# Patient Record
Sex: MALE | Race: WHITE | NOT HISPANIC OR LATINO | URBAN - METROPOLITAN AREA
[De-identification: names, ages, dates, MRNs, and addresses within clinical notes are randomized per-mention and may not be internally consistent; named-entity substitution may affect disease eponyms.]

---

## 2020-07-25 ENCOUNTER — TELEPHONE ENCOUNTER (OUTPATIENT)
Dept: URBAN - METROPOLITAN AREA CLINIC 13 | Facility: CLINIC | Age: 45
End: 2020-07-25

## 2020-07-25 RX ORDER — ESOMEPRAZOLE MAGNESIUM 40 MG
TAKE 1 CAPSULE DAILY UNKNOWN DOSE PER PATIENT CAPSULE,DELAYED RELEASE (ENTERIC COATED) ORAL
Refills: 0 | OUTPATIENT
End: 2015-09-01

## 2020-07-25 RX ORDER — BUSPIRONE HYDROCHLORIDE 10 MG/1
TAKE 1/2 TABLET TWICE DAILY FOR THE 1ST MONTH, THEN 1 TABLET TWICE DAILY THEREAFTER TABLET ORAL
Qty: 30 | Refills: 3 | OUTPATIENT
Start: 2015-11-02 | End: 2016-03-08

## 2020-07-25 RX ORDER — BUSPIRONE HYDROCHLORIDE 5 MG/1
TAKE 1 TABLET TWICE DAILY TABLET ORAL
Qty: 60 | Refills: 1 | OUTPATIENT
Start: 2015-02-26 | End: 2015-04-07

## 2020-07-25 RX ORDER — AMITRIPTYLINE HYDROCHLORIDE 25 MG/1
TAKE 1 TABLET AT BEDTIME TABLET, FILM COATED ORAL
Qty: 30 | Refills: 6 | OUTPATIENT
Start: 2016-01-07 | End: 2016-03-08

## 2020-07-25 RX ORDER — NYSTATIN 100000 [USP'U]/G
APPLY SPARINGLY TO AFFECTED AREA(S) TWICE DAILY POWDER TOPICAL
Qty: 60 | Refills: 1 | OUTPATIENT
Start: 2016-06-28 | End: 2016-08-10

## 2020-07-25 RX ORDER — OMEPRAZOLE 40 MG/1
TAKE 1 CAPSULE TWICE DAILY BEFORE BREAKFAST AND BEFORE DINNER CAPSULE, DELAYED RELEASE ORAL
Qty: 180 | Refills: 3 | OUTPATIENT
Start: 2015-02-26 | End: 2015-09-01

## 2020-07-26 ENCOUNTER — TELEPHONE ENCOUNTER (OUTPATIENT)
Dept: URBAN - METROPOLITAN AREA CLINIC 13 | Facility: CLINIC | Age: 45
End: 2020-07-26

## 2020-07-26 RX ORDER — FEXOFENADINE HCL 180 MG/1
TABLET ORAL
Qty: 90 | Refills: 0 | Status: ACTIVE | COMMUNITY
Start: 2018-02-19

## 2020-07-26 RX ORDER — FEXOFENADINE HCL 180 MG/1
TAKE 1 TABLET DAILY TABLET ORAL
Refills: 0 | Status: ACTIVE | COMMUNITY
Start: 2018-02-02

## 2020-07-26 RX ORDER — FLUTICASONE PROPIONATE 50 UG/1
SPRAY, METERED NASAL
Qty: 16 | Refills: 0 | Status: ACTIVE | COMMUNITY
Start: 2018-01-18

## 2020-07-26 RX ORDER — IPRATROPIUM BROMIDE 21 UG/1
SPRAY, METERED NASAL
Qty: 30 | Refills: 0 | Status: ACTIVE | COMMUNITY
Start: 2018-11-05

## 2020-07-26 RX ORDER — TRIAMCINOLONE ACETONIDE 1 MG/G
CREAM TOPICAL
Qty: 454 | Refills: 0 | Status: ACTIVE | COMMUNITY
Start: 2018-11-26

## 2020-10-20 ENCOUNTER — LAB OUTSIDE AN ENCOUNTER (OUTPATIENT)
Dept: URBAN - METROPOLITAN AREA CLINIC 113 | Facility: CLINIC | Age: 45
End: 2020-10-20

## 2020-10-20 ENCOUNTER — OFFICE VISIT (OUTPATIENT)
Dept: URBAN - METROPOLITAN AREA CLINIC 113 | Facility: CLINIC | Age: 45
End: 2020-10-20
Payer: OTHER GOVERNMENT

## 2020-10-20 VITALS
WEIGHT: 190 LBS | DIASTOLIC BLOOD PRESSURE: 74 MMHG | HEIGHT: 77 IN | HEART RATE: 72 BPM | TEMPERATURE: 98.6 F | BODY MASS INDEX: 22.43 KG/M2 | SYSTOLIC BLOOD PRESSURE: 118 MMHG | RESPIRATION RATE: 20 BRPM

## 2020-10-20 DIAGNOSIS — K58.9 IRRITABLE BOWEL SYNDROME: ICD-10-CM

## 2020-10-20 DIAGNOSIS — E83.119 HEMOCHROMATOSIS: ICD-10-CM

## 2020-10-20 PROCEDURE — G8482 FLU IMMUNIZE ORDER/ADMIN: HCPCS | Performed by: INTERNAL MEDICINE

## 2020-10-20 PROCEDURE — G8427 DOCREV CUR MEDS BY ELIG CLIN: HCPCS | Performed by: INTERNAL MEDICINE

## 2020-10-20 PROCEDURE — 1036F TOBACCO NON-USER: CPT | Performed by: INTERNAL MEDICINE

## 2020-10-20 PROCEDURE — 99214 OFFICE O/P EST MOD 30 MIN: CPT | Performed by: INTERNAL MEDICINE

## 2020-10-20 PROCEDURE — G8420 CALC BMI NORM PARAMETERS: HCPCS | Performed by: INTERNAL MEDICINE

## 2020-10-20 RX ORDER — TRIAMCINOLONE ACETONIDE 1 MG/G
CREAM TOPICAL
Qty: 454 | Refills: 0 | Status: DISCONTINUED | COMMUNITY
Start: 2018-11-26

## 2020-10-20 RX ORDER — FEXOFENADINE HCL 180 MG/1
TAKE 1 TABLET DAILY TABLET ORAL
Refills: 0 | Status: DISCONTINUED | COMMUNITY
Start: 2018-02-02

## 2020-10-20 RX ORDER — FLUTICASONE PROPIONATE 50 UG/1
SPRAY, METERED NASAL
Qty: 16 | Refills: 0 | Status: DISCONTINUED | COMMUNITY
Start: 2018-01-18

## 2020-10-20 RX ORDER — IPRATROPIUM BROMIDE 21 UG/1
SPRAY, METERED NASAL
Qty: 30 | Refills: 0 | Status: DISCONTINUED | COMMUNITY
Start: 2018-11-05

## 2020-10-20 NOTE — HPI-TODAY'S VISIT:
Patient returns today in follow-up.  Has been most recently followed by Dr. Gamboa for irritable bowel syndrome.  He describes some fluctuating bowel movements with occasional abdominal pain.  No real precipitant although seems to occur more often when he is traveling.  He is not on a daily supplement on a regular basis as the recommended dose of fiber was too much for him.  He denies any blood in his stool.  No fevers or chills. He is really here today with a new complaint of request.  He was diagnosed previously by Dr. Ender Duncan with hemochromatosis based on an elevated iron saturation and ferritin.  He has no history of liver disease.  No jaundice or dark urine.  No blood in the stool.  No known family history of liver disease.  His records are reviewed showing most recent labs with Dr. Laurent showing a normal LFT panel.  His hemoglobin is 14.7 platelet of 184 and a ferritin of 231.  He tells me he has 1 more phlebotomy planned prior to following up with Dr. Duncan.  He does not believe he has had any abdominal imaging.

## 2020-12-14 ENCOUNTER — OFFICE VISIT (OUTPATIENT)
Dept: URBAN - METROPOLITAN AREA CLINIC 113 | Facility: CLINIC | Age: 45
End: 2020-12-14
Payer: OTHER GOVERNMENT

## 2020-12-14 VITALS
HEIGHT: 77 IN | HEART RATE: 90 BPM | TEMPERATURE: 98.2 F | BODY MASS INDEX: 22.67 KG/M2 | WEIGHT: 192 LBS | SYSTOLIC BLOOD PRESSURE: 99 MMHG | RESPIRATION RATE: 20 BRPM | DIASTOLIC BLOOD PRESSURE: 65 MMHG

## 2020-12-14 DIAGNOSIS — K58.9 IRRITABLE BOWEL SYNDROME: ICD-10-CM

## 2020-12-14 DIAGNOSIS — R79.89 ELEVATED LFTS: ICD-10-CM

## 2020-12-14 DIAGNOSIS — E83.119 HEMOCHROMATOSIS: ICD-10-CM

## 2020-12-14 PROCEDURE — G8482 FLU IMMUNIZE ORDER/ADMIN: HCPCS | Performed by: INTERNAL MEDICINE

## 2020-12-14 PROCEDURE — G8420 CALC BMI NORM PARAMETERS: HCPCS | Performed by: INTERNAL MEDICINE

## 2020-12-14 PROCEDURE — 1036F TOBACCO NON-USER: CPT | Performed by: INTERNAL MEDICINE

## 2020-12-14 PROCEDURE — 99213 OFFICE O/P EST LOW 20 MIN: CPT | Performed by: INTERNAL MEDICINE

## 2020-12-14 PROCEDURE — G8427 DOCREV CUR MEDS BY ELIG CLIN: HCPCS | Performed by: INTERNAL MEDICINE

## 2020-12-14 NOTE — HPI-TODAY'S VISIT:
Patient returns today in follow-up.  He has history of hemochromatosis.  He is getting getting phlebotomy under the direction of Dr. Duncan.  He denies any new complaints.  No abdominal pain, nausea or vomiting.  No blood in the stool.  No jaundice or dark urine.  He does have fluctuating bowel patterns consistent with his known irritable bowel syndrome.  He reports he can get diarrhea and then will take a half of Imodium.  Sometimes this can back him up.  No real abdominal cramping with it.  He has been taking fiber on most days.  Ultrasound was reviewed with him which showed no evidence of HCC or advanced liver problems.

## 2020-12-22 ENCOUNTER — WEB ENCOUNTER (OUTPATIENT)
Dept: URBAN - METROPOLITAN AREA CLINIC 113 | Facility: CLINIC | Age: 45
End: 2020-12-22

## 2021-11-24 ENCOUNTER — TELEPHONE ENCOUNTER (OUTPATIENT)
Dept: URBAN - METROPOLITAN AREA CLINIC 113 | Facility: CLINIC | Age: 46
End: 2021-11-24

## 2022-11-18 ENCOUNTER — OFFICE VISIT (OUTPATIENT)
Dept: URBAN - METROPOLITAN AREA CLINIC 113 | Facility: CLINIC | Age: 47
End: 2022-11-18
Payer: OTHER GOVERNMENT

## 2022-11-18 VITALS
DIASTOLIC BLOOD PRESSURE: 79 MMHG | SYSTOLIC BLOOD PRESSURE: 119 MMHG | HEART RATE: 79 BPM | WEIGHT: 192 LBS | RESPIRATION RATE: 16 BRPM | HEIGHT: 77 IN | TEMPERATURE: 97.5 F | BODY MASS INDEX: 22.67 KG/M2

## 2022-11-18 DIAGNOSIS — E83.119 HEMOCHROMATOSIS: ICD-10-CM

## 2022-11-18 DIAGNOSIS — K86.89 PANCREATIC INSUFFICIENCY: ICD-10-CM

## 2022-11-18 DIAGNOSIS — R10.9 RIGHT SIDED ABDOMINAL PAIN: ICD-10-CM

## 2022-11-18 DIAGNOSIS — R63.4 WEIGHT LOSS: ICD-10-CM

## 2022-11-18 DIAGNOSIS — R79.89 ELEVATED LFTS: ICD-10-CM

## 2022-11-18 PROBLEM — 399187006 HEMOCHROMATOSIS: Status: ACTIVE | Noted: 2020-10-20

## 2022-11-18 PROCEDURE — 99214 OFFICE O/P EST MOD 30 MIN: CPT | Performed by: NURSE PRACTITIONER

## 2022-11-18 RX ORDER — DICYCLOMINE HYDROCHLORIDE 10 MG/1
1 CAPSULE CAPSULE ORAL
Qty: 60 | Refills: 1 | OUTPATIENT
Start: 2022-11-18 | End: 2023-01-17

## 2022-11-18 RX ORDER — PANCRELIPASE 36000; 180000; 114000 [USP'U]/1; [USP'U]/1; [USP'U]/1
2 TABS WITH MEALS, 1 WITH SNACKS CAPSULE, DELAYED RELEASE PELLETS ORAL
Qty: 720 | Refills: 3 | OUTPATIENT
Start: 2022-11-18 | End: 2023-11-13

## 2022-11-18 NOTE — HPI-TODAY'S VISIT:
48 yo with a history of hemochromatosis, elevated LFTs and IBS, presenting for evaluation of pancreas insufficiency. He was seen in the [facility] in December 2020. At that time, he was getting phlebotomy at the direction of Dr. Duncan. His ferritin was low, so he was expected to be transitioned to maintenance phlebotomy. Abdominal US was unremarkable for evidence of HCC or advanced liver disease. He was using Imodium as needed for diarrhea.   Labs 7/13/22: WBC 5.45, Hg 15.3, MCV 92/5. Plt 171, Ferritin 43.4, Iron 136, Tbili 1, AST 23, ALT 21, ALP 67, Na 3.9, BUN 17  He has not required phlebotomy since 2020. He tells me that now there is question about the hemochromatosis testing.   He continues with diarrhea predominant bowel habit, with at least 2 to 3 stools per day. He has had stomach issues for years. In the last 6 months, he has been experiencing increased fatigue and unexplained weight loss of 10-15 pounds. He is not exercising. He has episodes of increased diarrhea predominant bowel habits with 3 to 6 urgent loose stools per day associated with abdominal pain and a migraine. These episodes are not related to any change in diet. He can slow down the diarrhea with use of 1/2 an Imodium tablet. He tries not to take Imodium because he then can have constipation. There is occasionally blood per rectum, attributed to hemorrhoidal etiology as it is small in volume and infrequent. There is no black stool. He can have nausea associated with migraines and abdominal pain, but no vomiting. He admits feeling hungry constantly despite eating 3 normal sized meals and snacking frequently. He denies any early satiety.  He tells me that he had recent labs an stool studies with Dr. Laurent notable for pancreas insufficiency. We do not have these labs for review currently.  He has a new complaint of right sided abdominal pain, occurring constantly, that started about 2 weeks ago. The pain is increasing in severity. No alleviating or exacerbating factors identified. Pain is likened to a dull, pinching pain that is becoming sharper overtime. He has not had any abdominal imaging.

## 2022-12-02 ENCOUNTER — TELEPHONE ENCOUNTER (OUTPATIENT)
Dept: URBAN - METROPOLITAN AREA CLINIC 113 | Facility: CLINIC | Age: 47
End: 2022-12-02

## 2022-12-29 ENCOUNTER — OFFICE VISIT (OUTPATIENT)
Dept: URBAN - METROPOLITAN AREA CLINIC 113 | Facility: CLINIC | Age: 47
End: 2022-12-29
Payer: OTHER GOVERNMENT

## 2022-12-29 VITALS
HEIGHT: 77 IN | HEART RATE: 78 BPM | WEIGHT: 196 LBS | DIASTOLIC BLOOD PRESSURE: 77 MMHG | RESPIRATION RATE: 18 BRPM | BODY MASS INDEX: 23.14 KG/M2 | SYSTOLIC BLOOD PRESSURE: 127 MMHG | TEMPERATURE: 97.6 F

## 2022-12-29 DIAGNOSIS — R10.9 RIGHT SIDED ABDOMINAL PAIN: ICD-10-CM

## 2022-12-29 DIAGNOSIS — E83.110 HEREDITARY HEMOCHROMATOSIS: ICD-10-CM

## 2022-12-29 DIAGNOSIS — K58.9 IRRITABLE BOWEL SYNDROME: ICD-10-CM

## 2022-12-29 DIAGNOSIS — K86.89 PANCREATIC INSUFFICIENCY: ICD-10-CM

## 2022-12-29 PROBLEM — 10743008 IRRITABLE BOWEL SYNDROME: Status: ACTIVE | Noted: 2020-10-20

## 2022-12-29 PROBLEM — 35400008: Status: ACTIVE | Noted: 2022-12-29

## 2022-12-29 PROCEDURE — 99213 OFFICE O/P EST LOW 20 MIN: CPT | Performed by: INTERNAL MEDICINE

## 2022-12-29 RX ORDER — DICYCLOMINE HYDROCHLORIDE 10 MG/1
1 CAPSULE CAPSULE ORAL
Qty: 60 | Refills: 1 | Status: ACTIVE | COMMUNITY
Start: 2022-11-18 | End: 2023-01-17

## 2022-12-29 RX ORDER — PANCRELIPASE 36000; 180000; 114000 [USP'U]/1; [USP'U]/1; [USP'U]/1
2 TABS WITH MEALS, 1 WITH SNACKS CAPSULE, DELAYED RELEASE PELLETS ORAL
OUTPATIENT
Start: 2022-11-18

## 2022-12-29 RX ORDER — DICYCLOMINE HYDROCHLORIDE 10 MG/1
1 CAPSULE CAPSULE ORAL
OUTPATIENT
Start: 2022-11-18

## 2022-12-29 RX ORDER — PANCRELIPASE 36000; 180000; 114000 [USP'U]/1; [USP'U]/1; [USP'U]/1
2 TABS WITH MEALS, 1 WITH SNACKS CAPSULE, DELAYED RELEASE PELLETS ORAL
Qty: 720 | Refills: 3 | Status: ACTIVE | COMMUNITY
Start: 2022-11-18 | End: 2023-11-13

## 2022-12-29 NOTE — HPI-TODAY'S VISIT:
Patient returns today in follow-up.  History of hemochromatosis followed by Dr. Duncan.  He has not required phlebotomy as he has had a persistently low ferritin.  He has a variety of complaints including abdominal discomfort and diarrhea which has been evaluated in the past felt to be irritable bowel syndrome.  He was placed on Bentyl and fiber.  More recently he was evaluated for some ongoing diarrhea after a fecal elastase was found to be low and treated presumptively for EPI.  He states that he thinks that the enzyme supplementation is helped some.  He thought it may have caused him to have more diarrhea.  However he also stopped his fiber when he started the enzyme supplementation.  There is no blood in the stool.  His abdominal pain is improved.  He is not having new weight loss.  He has chronic joint complaints which have been evaluated by [facility] where he describes himself as a frequent flyer.  He has a lot of fatigue.  He does describe fecal urgency.

## 2023-01-21 ENCOUNTER — WEB ENCOUNTER (OUTPATIENT)
Dept: URBAN - METROPOLITAN AREA SURGERY CENTER 25 | Facility: SURGERY CENTER | Age: 48
End: 2023-01-21

## 2023-01-31 ENCOUNTER — TELEPHONE ENCOUNTER (OUTPATIENT)
Dept: URBAN - METROPOLITAN AREA CLINIC 113 | Facility: CLINIC | Age: 48
End: 2023-01-31

## 2023-01-31 RX ORDER — PANCRELIPASE 36000; 180000; 114000 [USP'U]/1; [USP'U]/1; [USP'U]/1
2 TABS WITH MEALS, 1 WITH SNACKS CAPSULE, DELAYED RELEASE PELLETS ORAL
Qty: 630 CAPSULE | Refills: 3

## 2023-10-30 ENCOUNTER — TELEPHONE ENCOUNTER (OUTPATIENT)
Dept: URBAN - METROPOLITAN AREA CLINIC 113 | Facility: CLINIC | Age: 48
End: 2023-10-30

## 2023-10-30 RX ORDER — PANCRELIPASE 36000; 180000; 114000 [USP'U]/1; [USP'U]/1; [USP'U]/1
2 TABS WITH MEALS, 1 WITH SNACKS CAPSULE, DELAYED RELEASE PELLETS ORAL
Qty: 630 CAPSULE | Refills: 3
End: 2024-10-24

## 2023-11-03 ENCOUNTER — ERX REFILL RESPONSE (OUTPATIENT)
Dept: URBAN - METROPOLITAN AREA CLINIC 113 | Facility: CLINIC | Age: 48
End: 2023-11-03

## 2023-11-03 RX ORDER — PANCRELIPASE 36000; 180000; 114000 [USP'U]/1; [USP'U]/1; [USP'U]/1
2 TABS WITH MEALS, 1 WITH SNACKS CAPSULE, DELAYED RELEASE PELLETS ORAL
Qty: 630 CAPSULE | Refills: 3 | OUTPATIENT

## 2023-11-03 RX ORDER — PANCRELIPASE 36000; 180000; 114000 [USP'U]/1; [USP'U]/1; [USP'U]/1
TAKE 2 CAPSULES WITH MEALS (DURING EVERY MEAL) AND 1 CAPSULE WITH SNACKS (DURING EVERY SNACK) CAPSULE, DELAYED RELEASE PELLETS ORAL
Qty: 630 CAPSULE | Refills: 4 | OUTPATIENT

## 2023-12-27 ENCOUNTER — DASHBOARD ENCOUNTERS (OUTPATIENT)
Age: 48
End: 2023-12-27

## 2023-12-27 ENCOUNTER — CLAIMS CREATED FROM THE CLAIM WINDOW (OUTPATIENT)
Dept: URBAN - METROPOLITAN AREA CLINIC 113 | Facility: CLINIC | Age: 48
End: 2023-12-27
Payer: OTHER GOVERNMENT

## 2023-12-27 VITALS
SYSTOLIC BLOOD PRESSURE: 116 MMHG | TEMPERATURE: 97.3 F | BODY MASS INDEX: 22.43 KG/M2 | RESPIRATION RATE: 16 BRPM | HEART RATE: 63 BPM | HEIGHT: 77 IN | DIASTOLIC BLOOD PRESSURE: 73 MMHG | WEIGHT: 190 LBS

## 2023-12-27 DIAGNOSIS — E83.119 HEMOCHROMATOSIS: ICD-10-CM

## 2023-12-27 DIAGNOSIS — K86.89 PANCREATIC INSUFFICIENCY: ICD-10-CM

## 2023-12-27 DIAGNOSIS — K58.9 IRRITABLE BOWEL SYNDROME: ICD-10-CM

## 2023-12-27 DIAGNOSIS — K58.8 OTHER IRRITABLE BOWEL SYNDROME: ICD-10-CM

## 2023-12-27 PROCEDURE — 99214 OFFICE O/P EST MOD 30 MIN: CPT | Performed by: INTERNAL MEDICINE

## 2023-12-27 RX ORDER — PANCRELIPASE 36000; 180000; 114000 [USP'U]/1; [USP'U]/1; [USP'U]/1
TAKE 2 CAPSULES WITH MEALS (DURING EVERY MEAL) AND 1 CAPSULE WITH SNACKS (DURING EVERY SNACK) CAPSULE, DELAYED RELEASE PELLETS ORAL
Qty: 630 CAPSULE | Refills: 4 | Status: ACTIVE | COMMUNITY

## 2023-12-27 RX ORDER — PANCRELIPASE 36000; 180000; 114000 [USP'U]/1; [USP'U]/1; [USP'U]/1
TAKE 2 CAPSULES WITH MEALS (DURING EVERY MEAL) AND 1 CAPSULE WITH SNACKS (DURING EVERY SNACK) CAPSULE, DELAYED RELEASE PELLETS ORAL
Qty: 630 CAPSULE | Refills: 3

## 2023-12-27 RX ORDER — DICYCLOMINE HYDROCHLORIDE 10 MG/1
1 CAPSULE CAPSULE ORAL
Status: ON HOLD | COMMUNITY
Start: 2022-11-18

## 2023-12-27 NOTE — HPI-TODAY'S VISIT:
Patient returns today in follow-up.  He reports that he is doing relatively well.  Sometimes needs extra pancreatic enzymes which does help with loose stool and fecal urgency.  Not really having much in the way abdominal cramping.  Not requiring dicyclomine.  Denies any jaundice or dark urine.  He brings with him labs which I reviewed from November.  He had a normal CBC.  Creatinine was normal.  LFTs were normal.  Iron saturation was 64% but his ferritin was only 93.  TSH was normal.

## 2024-11-10 ENCOUNTER — ERX REFILL RESPONSE (OUTPATIENT)
Dept: URBAN - METROPOLITAN AREA CLINIC 113 | Facility: CLINIC | Age: 49
End: 2024-11-10

## 2024-11-10 RX ORDER — PANCRELIPASE 36000; 180000; 114000 [USP'U]/1; [USP'U]/1; [USP'U]/1
TAKE 2 CAPSULES WITH MEALS (DURING EVERY MEAL) AND 1 CAPSULE WITH SNACKS (DURING EVERY SNACK) CAPSULE, DELAYED RELEASE PELLETS ORAL
Qty: 630 CAPSULE | Refills: 3 | OUTPATIENT

## 2024-11-10 RX ORDER — PANCRELIPASE 36000; 180000; 114000 [USP'U]/1; [USP'U]/1; [USP'U]/1
TAKE 2 CAPSULES WITH MEALS (DURING EVERY MEAL) AND 1 CAPSULE WITH SNACKS (DURING EVERY SNACK) CAPSULE, DELAYED RELEASE PELLETS ORAL
Qty: 600 CAPSULE | Refills: 5 | OUTPATIENT

## 2024-11-11 ENCOUNTER — TELEPHONE ENCOUNTER (OUTPATIENT)
Dept: URBAN - METROPOLITAN AREA CLINIC 113 | Facility: CLINIC | Age: 49
End: 2024-11-11

## 2024-11-11 PROBLEM — 47367009: Status: ACTIVE | Noted: 2024-11-11

## 2024-11-26 LAB
(TTG) AB, IGG: <1
ANTIGLIADIN ABS, IGA: <1
GLIADIN (DEAMIDATED) AB (IGG): <1
IMMUNOGLOBULIN A: 273
T-TRANSGLUTAMINASE (TTG) IGA: <1

## 2024-12-20 ENCOUNTER — OFFICE VISIT (OUTPATIENT)
Dept: URBAN - METROPOLITAN AREA CLINIC 113 | Facility: CLINIC | Age: 49
End: 2024-12-20
Payer: OTHER GOVERNMENT

## 2024-12-20 VITALS
TEMPERATURE: 97.5 F | DIASTOLIC BLOOD PRESSURE: 66 MMHG | WEIGHT: 196 LBS | BODY MASS INDEX: 23.14 KG/M2 | RESPIRATION RATE: 18 BRPM | SYSTOLIC BLOOD PRESSURE: 106 MMHG | HEART RATE: 72 BPM | HEIGHT: 77 IN

## 2024-12-20 DIAGNOSIS — K58.9 IRRITABLE BOWEL SYNDROME: ICD-10-CM

## 2024-12-20 DIAGNOSIS — K86.89 PANCREATIC INSUFFICIENCY: ICD-10-CM

## 2024-12-20 DIAGNOSIS — E83.119 HEMOCHROMATOSIS: ICD-10-CM

## 2024-12-20 PROCEDURE — 99214 OFFICE O/P EST MOD 30 MIN: CPT | Performed by: INTERNAL MEDICINE

## 2024-12-20 RX ORDER — PANCRELIPASE 36000; 180000; 114000 [USP'U]/1; [USP'U]/1; [USP'U]/1
TAKE 2 CAPSULES WITH MEALS (DURING EVERY MEAL) AND 1 CAPSULE WITH SNACKS (DURING EVERY SNACK) CAPSULE, DELAYED RELEASE PELLETS ORAL
Qty: 600 CAPSULE | Refills: 5 | Status: ACTIVE | COMMUNITY

## 2024-12-20 RX ORDER — DICYCLOMINE HYDROCHLORIDE 10 MG/1
1 CAPSULE CAPSULE ORAL
Status: ON HOLD | COMMUNITY
Start: 2022-11-18

## 2024-12-20 RX ORDER — DICYCLOMINE HYDROCHLORIDE 10 MG/1
1 CAPSULE CAPSULE ORAL
OUTPATIENT
Start: 2022-11-18

## 2024-12-20 NOTE — HPI-TODAY'S VISIT:
Patient presents today in follow-up.  He reports he is overall doing very well.  He has multiple autoimmune conditions and there are some question of celiac disease.  He went back on gluten and had laboratory testing done at the end of November.  This is reviewed below.  He is back off gluten now.  He reports his GI symptoms including diarrhea and bloating are much worse on gluten.  These have resolved and he is having wonderful bowel function now.  He remains on Creon although has decreased the amount.  Question if he should continue this.  No blood in the stool.  No fevers.  Denies any swallowing difficulty.  Irritable bowel syndrome is well-controlled without much cramping.

## 2025-07-28 ENCOUNTER — OFFICE VISIT (OUTPATIENT)
Dept: URBAN - METROPOLITAN AREA CLINIC 113 | Facility: CLINIC | Age: 50
End: 2025-07-28
Payer: OTHER GOVERNMENT

## 2025-07-28 DIAGNOSIS — K58.9 IRRITABLE BOWEL SYNDROME: ICD-10-CM

## 2025-07-28 DIAGNOSIS — E83.119 HEMOCHROMATOSIS: ICD-10-CM

## 2025-07-28 DIAGNOSIS — K86.89 PANCREATIC INSUFFICIENCY: ICD-10-CM

## 2025-07-28 PROCEDURE — 99214 OFFICE O/P EST MOD 30 MIN: CPT | Performed by: INTERNAL MEDICINE

## 2025-07-28 RX ORDER — PANCRELIPASE 36000; 180000; 114000 [USP'U]/1; [USP'U]/1; [USP'U]/1
TAKE 2 CAPSULES WITH MEALS (DURING EVERY MEAL) AND 1 CAPSULE WITH SNACKS (DURING EVERY SNACK) CAPSULE, DELAYED RELEASE PELLETS ORAL
Qty: 600 CAPSULE | Refills: 5 | Status: ACTIVE | COMMUNITY

## 2025-07-28 RX ORDER — PANCRELIPASE 36000; 180000; 114000 [USP'U]/1; [USP'U]/1; [USP'U]/1
TAKE 2 CAPSULES WITH MEALS (DURING EVERY MEAL) AND 1 CAPSULE WITH SNACKS (DURING EVERY SNACK) CAPSULE, DELAYED RELEASE PELLETS ORAL
OUTPATIENT

## 2025-07-28 RX ORDER — DICYCLOMINE HYDROCHLORIDE 10 MG/1
1 CAPSULE CAPSULE ORAL
Status: ON HOLD | COMMUNITY
Start: 2022-11-18

## 2025-07-28 NOTE — HPI-TODAY'S VISIT:
Patient presents today in follow-up.  He reports overall he is doing well.  He is on Humira for ankylosing spondylitis.  He reports no back pain but apparently has significant fusion.  His bowel movements have been doing well with a gluten restricted diet.  He is taking the Creon still.  He has thought about trying to come off it but has been concerned about potential worsening of symptoms.  He has not had recent labs done.

## 2025-07-29 LAB
A/G RATIO: 1.5
ABSOLUTE BASOPHILS: 91
ABSOLUTE EOSINOPHILS: 260
ABSOLUTE LYMPHOCYTES: 2379
ABSOLUTE MONOCYTES: 592
ABSOLUTE NEUTROPHILS: 3179
ALBUMIN: 4.3
ALKALINE PHOSPHATASE: 64
ALT (SGPT): 18
AST (SGOT): 16
BASOPHILS: 1.4
BILIRUBIN, TOTAL: 0.5
BUN/CREATININE RATIO: (no result)
BUN: 19
CALCIUM: 9.3
CARBON DIOXIDE, TOTAL: 29
CHLORIDE: 103
CREATININE: 0.96
EGFR: 95
EOSINOPHILS: 4
FERRITIN, SERUM: 61
GLOBULIN, TOTAL: 2.9
GLUCOSE: 88
HEMATOCRIT: 47.5
HEMOGLOBIN: 15.4
LYMPHOCYTES: 36.6
MCH: 31.8
MCHC: 32.4
MCV: 97.9
MONOCYTES: 9.1
MPV: 11
NEUTROPHILS: 48.9
PLATELET COUNT: 189
POTASSIUM: 4
PROTEIN, TOTAL: 7.2
RDW: 13.1
RED BLOOD CELL COUNT: 4.85
SODIUM: 140
WHITE BLOOD CELL COUNT: 6.5

## 2025-08-15 ENCOUNTER — WEB ENCOUNTER (OUTPATIENT)
Dept: URBAN - METROPOLITAN AREA CLINIC 113 | Facility: CLINIC | Age: 50
End: 2025-08-15